# Patient Record
(demographics unavailable — no encounter records)

---

## 2018-08-03 NOTE — EMERGENCY DEPARTMENT REPORT
HPI





- General


Chief Complaint: Urogenital-Male


Time Seen by Provider: 08/03/18 10:22





- HPI


HPI: 





41-year-old  male presents to the emergency department with 

complaint of prolonged erections.  The patient says that he has issues where he 

wakes up with his normal morning erection but does not go away, sometimes all 

day.  He says that he presented to the emergency department today fully erect 

but it has since become flaccid but he still has a burning pain to the penis, 

feels like it is inside.  He denies any discharge, bleeding, dysuria, fever.  

He denies any past medical history.  He says he has been to 5 different 

emergency departments in the past for issues with priapism.  He has never seen 

a urologist.  Patient says that he took some Sudafed but did not have any 

relief.  No recent travel or sick contacts at home.





ED Past Medical Hx





- Past Medical History


Previous Medical History?: Yes


Additional medical history: priaprism.  head injury





- Surgical History


Past Surgical History?: No





- Social History


Smoking Status: Current Every Day Smoker


Substance Use Type: Alcohol





ED Review of Systems


ROS: 


Stated complaint: PRIAPISM


Other details as noted in HPI





Comment: All other systems reviewed and negative


Constitutional: denies: chills, fever


Eyes: denies: eye pain, eye discharge, vision change


ENT: denies: ear pain, throat pain


Respiratory: denies: cough, shortness of breath, wheezing


Cardiovascular: denies: chest pain, palpitations


Gastrointestinal: denies: abdominal pain, nausea, diarrhea


Genitourinary: other (penile pain).  denies: dysuria, discharge, testicular pain


Musculoskeletal: denies: back pain, joint swelling, arthralgia


Skin: denies: rash, lesions


Neurological: denies: headache, weakness, paresthesias





Physical Exam





- Physical Exam


Vital Signs: 


 Vital Signs











  08/03/18





  09:19


 


Temperature 97.6 F


 


Pulse Rate 77


 


Respiratory 18





Rate 


 


Blood Pressure 161/105


 


O2 Sat by Pulse 99





Oximetry 











Physical Exam: 





GENERAL: The patient is well-developed well-nourished.


HENT: Normocephalic.  Atraumatic.    Patient has moist mucous membranes.


EYES: Extraocular motions are intact.


NECK: Supple.  Trachea is midline.


CHEST/LUNGS: Clear to auscultation.  There is no respiratory distress noted.


HEART/CARDIOVASCULAR: Regular.  There is no tachycardia.  There is no murmur.


ABDOMEN: Abdomen is soft, nontender.  Patient has normal bowel sounds.  There 

is no abdominal distention.


SKIN: Skin is warm and dry.


NEURO: The patient is awake, alert, and oriented.  The patient is cooperative.  

The patient has no focal neurologic deficits.  The patient has normal speech 

and gait.


MUSCULOSKELETAL: There is no tenderness or deformity.  There is no limitation 

range of motion.  


: There is no reproducible tenderness palpation of the penis, scrotum or 

testicles.  There is no swelling, erythema or obvious deformity.





ED Course


 Vital Signs











  08/03/18





  09:19


 


Temperature 97.6 F


 


Pulse Rate 77


 


Respiratory 18





Rate 


 


Blood Pressure 161/105


 


O2 Sat by Pulse 99





Oximetry 














ED Medical Decision Making





- Lab Data


Result diagrams: 


 08/03/18 10:55





 08/03/18 10:55





- Medical Decision Making


The patient has a history of priapism and says that she has been to a total of 

5 different emergency departments for the priapism.  He has yet to follow up 

with a urologist as he says that he does not have any insurance until next 

month.  The patient came into the emergency department with a prolonged erection

, however by the time he is back in the main emergency department for my 

history and physical the patient no longer has an erection.  He still has some 

discomfort that feels like it is inside of the penis but there is no dysuria or 

discharge.  Labs are unremarkable.  No urinary tract infection.  The patient 

has been instructed to follow-up with a urologist as soon as possible but 

return to the emergency Department with any worsening of his symptoms or any 

acute distress.





Critical Care Time: No


Critical care attestation.: 


If time is entered above; I have spent that time in minutes in the direct care 

of this critically ill patient, excluding procedure time.








ED Disposition


Clinical Impression: 


 Priapism





Disposition: DC-01 TO HOME OR SELFCARE


Is pt being admited?: No


Condition: Stable


Instructions:  Priapism (ED)


Additional Instructions: 


Please follow up with Urology without fail. I am giving you a referral for 

Urology, Dr Yusuf, to follow up regarding your issues with prolonged 

erections. 


Referrals: 


CRISTOPHER YUSUF MD [Staff Physician] - ASAP


Forms:  Work/School Release Form(ED)